# Patient Record
Sex: FEMALE | Race: WHITE | NOT HISPANIC OR LATINO | URBAN - METROPOLITAN AREA
[De-identification: names, ages, dates, MRNs, and addresses within clinical notes are randomized per-mention and may not be internally consistent; named-entity substitution may affect disease eponyms.]

---

## 2019-06-22 ENCOUNTER — EMERGENCY (EMERGENCY)
Facility: HOSPITAL | Age: 44
LOS: 1 days | Discharge: HOME | End: 2019-06-22
Attending: EMERGENCY MEDICINE
Payer: COMMERCIAL

## 2019-06-22 VITALS
HEIGHT: 64 IN | HEART RATE: 104 BPM | OXYGEN SATURATION: 97 % | TEMPERATURE: 99 F | SYSTOLIC BLOOD PRESSURE: 121 MMHG | DIASTOLIC BLOOD PRESSURE: 82 MMHG | RESPIRATION RATE: 16 BRPM

## 2019-06-22 PROCEDURE — 73130 X-RAY EXAM OF HAND: CPT | Mod: 26,RT

## 2019-06-22 PROCEDURE — 73110 X-RAY EXAM OF WRIST: CPT | Mod: 26,RT

## 2019-06-22 PROCEDURE — 99283 EMERGENCY DEPT VISIT LOW MDM: CPT | Mod: 25

## 2019-06-22 PROCEDURE — 29125 APPL SHORT ARM SPLINT STATIC: CPT

## 2019-06-22 PROCEDURE — 29125 APPL SHORT ARM SPLINT STATIC: CPT | Mod: RT

## 2019-06-22 PROCEDURE — 99284 EMERGENCY DEPT VISIT MOD MDM: CPT | Mod: 25

## 2019-06-22 PROCEDURE — 73130 X-RAY EXAM OF HAND: CPT

## 2019-06-22 PROCEDURE — 73110 X-RAY EXAM OF WRIST: CPT

## 2019-06-22 RX ORDER — IBUPROFEN 200 MG
600 TABLET ORAL ONCE
Refills: 0 | Status: COMPLETED | OUTPATIENT
Start: 2019-06-22 | End: 2019-06-22

## 2019-06-22 RX ORDER — IBUPROFEN 200 MG
1 TABLET ORAL
Qty: 28 | Refills: 0
Start: 2019-06-22 | End: 2019-06-28

## 2019-06-22 RX ADMIN — Medication 600 MILLIGRAM(S): at 10:02

## 2019-06-22 NOTE — ED ADULT NURSE NOTE - NSIMPLEMENTINTERV_GEN_ALL_ED
Implemented All Universal Safety Interventions:  [place] to call system. Call bell, personal items and telephone within reach. Instruct patient to call for assistance. Room bathroom lighting operational. Non-slip footwear when patient is off stretcher. Physically safe environment: no spills, clutter or unnecessary equipment. Stretcher in lowest position, wheels locked, appropriate side rails in place.

## 2019-06-22 NOTE — ED STATDOCS - ATTENDING CONTRIBUTION TO CARE
I, John Andino, performed the initial face to face bedside interview with this patient regarding history of present illness, review of symptoms and relevant past medical, social and family history.  I completed an independent physical examination.  I was the initial provider who evaluated this patient. I have signed out the follow up of any pending tests (i.e. labs, radiological studies) to the ACP.  I have communicated the patient’s plan of care and disposition with the ACP.  The history, relevant review of systems, past medical and surgical history, medical decision making, and physical examination was documented by the scribe in my presence and I attest to the accuracy of the documentation.

## 2019-06-22 NOTE — ED STATDOCS - PROGRESS NOTE DETAILS
PRIMO Whaley: Patient evaluated by intake physician. HPI/ROS/PE as noted above. Will follow up plan per intake physician and continue to assess patient. States was climbing over fence, lost footing, landing on right side. Will splint given severity of pain and swelling. Refer to hand/ortho.  PCP: None

## 2019-06-22 NOTE — ED STATDOCS - OBJECTIVE STATEMENT
43 y/o F pt with no PMHx presents to the ED c/o right hand pain. Patient reports isolated right hand s/p trip and fall. Denies head injury, LOC, CP, SOB.

## 2019-06-22 NOTE — ED STATDOCS - NEURO NEGATIVE STATEMENT, MLM
no loss of consciousness, no gait abnormality, no headache, no sensory deficits, and no weakness. No head injury.

## 2019-06-22 NOTE — ED ADULT TRIAGE NOTE - CHIEF COMPLAINT QUOTE
pt states she tripped and fell over a wire fence this morning.  landed on right hip.  c/o right hand pain.

## 2020-10-20 NOTE — ED STATDOCS - CPE ED EYE NORM
Patient can be notified results are stable  and no adjustments or corrections need to be made at this time.
bilateral normal...

## 2022-06-16 ENCOUNTER — EMERGENCY (EMERGENCY)
Facility: HOSPITAL | Age: 47
LOS: 1 days | Discharge: HOME | End: 2022-06-16
Attending: STUDENT IN AN ORGANIZED HEALTH CARE EDUCATION/TRAINING PROGRAM
Payer: COMMERCIAL

## 2022-06-16 VITALS
RESPIRATION RATE: 18 BRPM | DIASTOLIC BLOOD PRESSURE: 81 MMHG | HEART RATE: 92 BPM | SYSTOLIC BLOOD PRESSURE: 118 MMHG | TEMPERATURE: 98 F | OXYGEN SATURATION: 96 %

## 2022-06-16 VITALS
TEMPERATURE: 98 F | WEIGHT: 235.67 LBS | SYSTOLIC BLOOD PRESSURE: 162 MMHG | OXYGEN SATURATION: 98 % | HEART RATE: 120 BPM | HEIGHT: 64 IN | DIASTOLIC BLOOD PRESSURE: 84 MMHG | RESPIRATION RATE: 18 BRPM

## 2022-06-16 DIAGNOSIS — Z98.891 HISTORY OF UTERINE SCAR FROM PREVIOUS SURGERY: Chronic | ICD-10-CM

## 2022-06-16 PROBLEM — Z78.9 OTHER SPECIFIED HEALTH STATUS: Chronic | Status: ACTIVE | Noted: 2019-06-22

## 2022-06-16 LAB
ALBUMIN SERPL ELPH-MCNC: 4.5 G/DL — SIGNIFICANT CHANGE UP (ref 3.3–5.2)
ALP SERPL-CCNC: 51 U/L — SIGNIFICANT CHANGE UP (ref 40–120)
ALT FLD-CCNC: 24 U/L — SIGNIFICANT CHANGE UP
ANION GAP SERPL CALC-SCNC: 15 MMOL/L — SIGNIFICANT CHANGE UP (ref 5–17)
APTT BLD: 26.6 SEC — LOW (ref 27.5–35.5)
AST SERPL-CCNC: 25 U/L — SIGNIFICANT CHANGE UP
BASOPHILS # BLD AUTO: 0.08 K/UL — SIGNIFICANT CHANGE UP (ref 0–0.2)
BASOPHILS NFR BLD AUTO: 1.1 % — SIGNIFICANT CHANGE UP (ref 0–2)
BILIRUB SERPL-MCNC: 0.8 MG/DL — SIGNIFICANT CHANGE UP (ref 0.4–2)
BLD GP AB SCN SERPL QL: SIGNIFICANT CHANGE UP
BUN SERPL-MCNC: 8.4 MG/DL — SIGNIFICANT CHANGE UP (ref 8–20)
CALCIUM SERPL-MCNC: 9 MG/DL — SIGNIFICANT CHANGE UP (ref 8.6–10.2)
CHLORIDE SERPL-SCNC: 104 MMOL/L — SIGNIFICANT CHANGE UP (ref 98–107)
CO2 SERPL-SCNC: 21 MMOL/L — LOW (ref 22–29)
CREAT SERPL-MCNC: 0.77 MG/DL — SIGNIFICANT CHANGE UP (ref 0.5–1.3)
EGFR: 95 ML/MIN/1.73M2 — SIGNIFICANT CHANGE UP
EOSINOPHIL # BLD AUTO: 0.38 K/UL — SIGNIFICANT CHANGE UP (ref 0–0.5)
EOSINOPHIL NFR BLD AUTO: 5.4 % — SIGNIFICANT CHANGE UP (ref 0–6)
GLUCOSE SERPL-MCNC: 95 MG/DL — SIGNIFICANT CHANGE UP (ref 70–99)
HCG SERPL-ACNC: <4 MIU/ML — SIGNIFICANT CHANGE UP
HCT VFR BLD CALC: 36.5 % — SIGNIFICANT CHANGE UP (ref 34.5–45)
HGB BLD-MCNC: 12.1 G/DL — SIGNIFICANT CHANGE UP (ref 11.5–15.5)
IMM GRANULOCYTES NFR BLD AUTO: 1.4 % — SIGNIFICANT CHANGE UP (ref 0–1.5)
INR BLD: 1 RATIO — SIGNIFICANT CHANGE UP (ref 0.88–1.16)
LYMPHOCYTES # BLD AUTO: 2.23 K/UL — SIGNIFICANT CHANGE UP (ref 1–3.3)
LYMPHOCYTES # BLD AUTO: 31.7 % — SIGNIFICANT CHANGE UP (ref 13–44)
MCHC RBC-ENTMCNC: 31.3 PG — SIGNIFICANT CHANGE UP (ref 27–34)
MCHC RBC-ENTMCNC: 33.2 GM/DL — SIGNIFICANT CHANGE UP (ref 32–36)
MCV RBC AUTO: 94.3 FL — SIGNIFICANT CHANGE UP (ref 80–100)
MONOCYTES # BLD AUTO: 0.57 K/UL — SIGNIFICANT CHANGE UP (ref 0–0.9)
MONOCYTES NFR BLD AUTO: 8.1 % — SIGNIFICANT CHANGE UP (ref 2–14)
NEUTROPHILS # BLD AUTO: 3.68 K/UL — SIGNIFICANT CHANGE UP (ref 1.8–7.4)
NEUTROPHILS NFR BLD AUTO: 52.3 % — SIGNIFICANT CHANGE UP (ref 43–77)
PLATELET # BLD AUTO: 303 K/UL — SIGNIFICANT CHANGE UP (ref 150–400)
POTASSIUM SERPL-MCNC: 3.9 MMOL/L — SIGNIFICANT CHANGE UP (ref 3.5–5.3)
POTASSIUM SERPL-SCNC: 3.9 MMOL/L — SIGNIFICANT CHANGE UP (ref 3.5–5.3)
PROT SERPL-MCNC: 6.6 G/DL — SIGNIFICANT CHANGE UP (ref 6.6–8.7)
PROTHROM AB SERPL-ACNC: 11.6 SEC — SIGNIFICANT CHANGE UP (ref 10.5–13.4)
RBC # BLD: 3.87 M/UL — SIGNIFICANT CHANGE UP (ref 3.8–5.2)
RBC # FLD: 13.1 % — SIGNIFICANT CHANGE UP (ref 10.3–14.5)
SODIUM SERPL-SCNC: 140 MMOL/L — SIGNIFICANT CHANGE UP (ref 135–145)
WBC # BLD: 7.04 K/UL — SIGNIFICANT CHANGE UP (ref 3.8–10.5)
WBC # FLD AUTO: 7.04 K/UL — SIGNIFICANT CHANGE UP (ref 3.8–10.5)

## 2022-06-16 PROCEDURE — 86900 BLOOD TYPING SEROLOGIC ABO: CPT

## 2022-06-16 PROCEDURE — 76856 US EXAM PELVIC COMPLETE: CPT

## 2022-06-16 PROCEDURE — 99284 EMERGENCY DEPT VISIT MOD MDM: CPT | Mod: 25

## 2022-06-16 PROCEDURE — 86850 RBC ANTIBODY SCREEN: CPT

## 2022-06-16 PROCEDURE — 76830 TRANSVAGINAL US NON-OB: CPT | Mod: 26

## 2022-06-16 PROCEDURE — 36415 COLL VENOUS BLD VENIPUNCTURE: CPT

## 2022-06-16 PROCEDURE — 76830 TRANSVAGINAL US NON-OB: CPT

## 2022-06-16 PROCEDURE — 86901 BLOOD TYPING SEROLOGIC RH(D): CPT

## 2022-06-16 PROCEDURE — 99243 OFF/OP CNSLTJ NEW/EST LOW 30: CPT

## 2022-06-16 PROCEDURE — 85025 COMPLETE CBC W/AUTO DIFF WBC: CPT

## 2022-06-16 PROCEDURE — 76856 US EXAM PELVIC COMPLETE: CPT | Mod: 26

## 2022-06-16 PROCEDURE — 84702 CHORIONIC GONADOTROPIN TEST: CPT

## 2022-06-16 PROCEDURE — 85610 PROTHROMBIN TIME: CPT

## 2022-06-16 PROCEDURE — 99285 EMERGENCY DEPT VISIT HI MDM: CPT

## 2022-06-16 PROCEDURE — 80053 COMPREHEN METABOLIC PANEL: CPT

## 2022-06-16 PROCEDURE — 85730 THROMBOPLASTIN TIME PARTIAL: CPT

## 2022-06-16 RX ORDER — NORETHINDRONE 0.35 MG/1
1 TABLET ORAL
Qty: 120 | Refills: 0
Start: 2022-06-16 | End: 2022-07-15

## 2022-06-16 RX ORDER — SODIUM CHLORIDE 9 MG/ML
1000 INJECTION, SOLUTION INTRAVENOUS ONCE
Refills: 0 | Status: COMPLETED | OUTPATIENT
Start: 2022-06-16 | End: 2022-06-16

## 2022-06-16 RX ADMIN — SODIUM CHLORIDE 1000 MILLILITER(S): 9 INJECTION, SOLUTION INTRAVENOUS at 18:57

## 2022-06-16 NOTE — ED ADULT NURSE REASSESSMENT NOTE - NS ED NURSE REASSESS COMMENT FT1
Report received, care assumed.  47 year old female presents to ED with complaint of vaginal bleeding.  A&Ox4  Awaiting GYN examination.  Resting comfortably on stretcher at this time in no apparent distress.  Offers no complaints at this time.

## 2022-06-16 NOTE — ED PROVIDER NOTE - CARE PROVIDER_API CALL
Marty Mcknight)  Obstetrics and Gynecology  [address], [address]  [place] [address]  Phone: [phone]  Fax: [phone]  Follow Up Time:

## 2022-06-16 NOTE — CONSULT NOTE ADULT - ASSESSMENT
47y  w persistent vaginal bleeding x2m, VSS, Hgb 12.1.    - no active bleeding on exam, VSS, Hgb 12.1, pt states she is feeling better  - US read appreciated  - norethindrone 5mg q6h sent to patient's pharmacy. She is to continue to take the medications until she follows up with a gynecologist for definitive management.   - recommend follow up tomorrow with Dr. Mcknight,  [facility] [place] [address], [phone]  - no indication for inpatient gyn management  - clear for dc from gyn perspective    discussed with Dr. Austin 47y  w persistent vaginal bleeding x2m, VSS, Hgb 12.1.    - no active bleeding on exam, VSS, Hgb 12.1, pt states she is feeling better  - US read appreciated  - norethindrone 5mg q6h sent to patient's pharmacy. She is to continue to take the medications until she follows up with a gynecologist for definitive management.   - recommend follow up tomorrow with Dr. Mcknight,  [facility] [place] [address], [phone]  - no indication for inpatient gyn management  - clear for dc from gyn perspective    discussed with Dr. Austin    Addendum:    Subjective Hx, Physical Exam, Laboratory & Imaging results reviewed.  I agree with the Resident Physician's assessment and plan of care, as discussed above.  Call, follow up, and return to Hospital parameters reviewed at length.  She was given the opportunity to ask questions and all were addressed.  Discharge home    Jason Austin, DO

## 2022-06-16 NOTE — ED PROVIDER NOTE - PHYSICAL EXAMINATION
General: well-appearing woman in no acute distress  Head: normocephalic, atraumatic  Eyes: clear eyes, pink conjunctiva  Mouth: moist mucous membranes  Neck: supple neck  CV: tachycardic, no LE edema, peripheral pulses 2+ bilateral UE and LE  Respiratory: clear to auscultation bilaterally  Abdomen: soft, nondistended, nontender  : no suprapubic tenderness, no CVAT  MSK: no joint deformities  Neuro: alert and oriented x3, speech clear, moving all extremities spontaneously without difficulty  Skin: no rash noted General: well-appearing woman in no acute distress  Head: normocephalic, atraumatic  Eyes: clear eyes, pink conjunctiva  Mouth: moist mucous membranes  Neck: supple neck  CV: tachycardic, no LE edema, peripheral pulses 2+ bilateral UE and LE  Respiratory: clear to auscultation bilaterally  Abdomen: soft, nondistended, nontender  : no suprapubic tenderness, no CVAT; chaperoned by Gisele, PCA, dark blood in vaginal canal with pooling but no hemorrhaging, no masses felt  MSK: no joint deformities  Neuro: alert and oriented x3, speech clear, moving all extremities spontaneously without difficulty  Skin: no rash noted

## 2022-06-16 NOTE — ED PROVIDER NOTE - NS ED ROS FT
General: no fever  Head: no headache, +lightheadedness  Eyes: no vision change  ENT: no nasal discharge/congestion  CV: no chest pain  Resp: no SOB, no cough  GI: no N/V/D, no abdominal pain  : no dysuria, +vaginal bleeding  MSK: no joint pain  Skin: no new rash  Neuro: no focal weakness, no change in sensation

## 2022-06-16 NOTE — CONSULT NOTE ADULT - SUBJECTIVE AND OBJECTIVE BOX
47y  presents c/o continuous bleeding for the past 2 months. Pt states that the bleeding increased in severity these past few weeks and she has been having to change her pads every 2-3 hours. She states today she was feeling a bit lightheaded and tachycardic, so she came to the ED for evaluation. She has not followed up with a gyn or PMD in many years. She states that she is no longer feeling lightheaded or dizzy currently. Pt states that she had regular monthly menses up until December and then did not have her menses again until April, and she has had persistent bleeding since. She denies any n/v, CP/SOB. No other complaints.     POb: CSx1   PGyn: pt denies any h/o abnl pap, STDs, fibroids, cysts  PMH: pt denies any medical conditions, including coagulopathies  PSH: CSx1   Meds: pt states she is not taking any medications regularly  All: sulfa, shellfish  SH: pt denies smoking, illicit drug, alcohol use; feels safe at home    Vital Signs Last 24 Hrs  T(C): 36.6 (2022 19:18), Max: 36.9 (2022 13:04)  T(F): 97.8 (2022 19:18), Max: 98.4 (2022 13:04)  HR: 92 (2022 19:18) (92 - 120)  BP: 118/81 (2022 19:18) (118/81 - 162/84)  RR: 18 (2022 19:18) (18 - 18)  SpO2: 96% (2022 19:18) (96% - 98%)    Gen: well-appearing, NAD   Resp: breathing comfortably on RA   Abd: nontender, soft, no rebound/guarding  VE: passage of clots w valsalva visualized on spec exam, no active bleeding                           12.1   7.04  )-----------( 303      ( 2022 16:57 )             36.5     06-16    140  |  104  |  8.4  ----------------------------<  95  3.9   |  21.0<L>  |  0.77    Ca    9.0      2022 16:57    TPro  6.6  /  Alb  4.5  /  TBili  0.8  /  DBili  x   /  AST  25  /  ALT  24  /  AlkPhos  51  -16    Type + Screen (22 @ 17:20)    ABO RH Interpretation: B POS: 2022 18:17  NCLUPO  Confirm type required if blood product is needed.    < from: US Transvaginal (22 @ 18:16) >  FINDINGS:  Uterus: 13.5 cm x 6.2 cm x 6.9 cm. Within normal limits.  Endometrium: 25 mm. Thickened with tiny cystic spaces. Hypervascular area   within theupper cervix of indeterminate significance.    Right ovary: 3.7 cm x 1.9 cm x 2.2 cm. Within normal limits.  Left ovary: Not visualized.    Fluid: None.    IMPRESSION:  Abnormally thickened endometrial stripe with tiny cystic spaces. Further   gynecologic evaluation is recommended. Correlation with beta hCG   recommended to confirm patient is not pregnant.    Hypervascular area within the upper cervix of indeterminate significance.   Underlying lesion in this region is not excluded.    Left ovarynot visualized.    --- End of Report ---    < end of copied text >

## 2022-06-16 NOTE — ED PROVIDER NOTE - CLINICAL SUMMARY MEDICAL DECISION MAKING FREE TEXT BOX
46yo woman presents with persistent vaginal bleeding x 2 months with associated lightheadedness today. post perimenopausal symptoms. evaluated with blood work to check h/h, hcg, urine, and ultrasound.

## 2022-06-16 NOTE — ED PROVIDER NOTE - OBJECTIVE STATEMENT
48yo woman PMH seasonal allergies presents with persistent vaginal bleeding x 2 months and associated lightheadedness today. No associated chest pain or SOB. Pt states that she likely had covid 6 months ago and started to have irregularly menses and started current menses about 2 months ago. Pt denies abdominal pain or dysuria. No associated n/v/d.

## 2022-06-16 NOTE — ED PROVIDER NOTE - NSFOLLOWUPINSTRUCTIONS_ED_ALL_ED_FT
You were seen and evaluated in the emergency room for vaginal bleeding    Please follow up with an OB tomorrow. Call the number attached to make an appointment.   Dr. Mcknight  [address] [place] [address] [phone]    Take the medication as prescribed every 6 hours.     Take 650mg tylenol every 6 hours as needed for pain.  Take 400mg ibuprofen every 6 hours as needed for pain, make sure to take with food.    Continue all other home medications as prescribed.    Seek care immediately if:   You have severe pain in your abdomen.   Your bowel movement has blood in it, or looks like black tar.   You cannot stop vomiting, or vomit blood.  Your abdomen is larger than usual, very painful, and hard.   You stop passing gas or having bowel movements.   You have heavy vaginal bleeding.  You have chest pain or shortness of breath.  You feel weak, dizzy, or faint.  Or any worse or abnormal symptoms.     Please read all attached.

## 2022-06-16 NOTE — ED ADULT TRIAGE NOTE - CHIEF COMPLAINT QUOTE
pt states having vaginal bleeding x 8 weeks, passing clots  A&Ox3, resp wnl, sent by urgent care for Sono

## 2022-06-16 NOTE — ED ADULT TRIAGE NOTE - TEMPERATURE IN FAHRENHEIT (DEGREES F)
Problem: Adult Inpatient Plan of Care  Goal: Plan of Care Review  Outcome: Ongoing, Progressing  Flowsheets (Taken 12/31/2021 0626)  Plan of Care Reviewed With: patient  Goal: Patient-Specific Goal (Individualized)  Outcome: Ongoing, Progressing  Flowsheets (Taken 12/31/2021 0626)  Anxieties, Fears or Concerns: oxygen level  Individualized Care Needs: monitor SpO2. AC/HS, PRN meds for anxiety and insomnia     Problem: Fluid and Electrolyte Imbalance (Acute Kidney Injury/Impairment)  Goal: Fluid and Electrolyte Balance  Outcome: Ongoing, Progressing     Problem: Fall Injury Risk  Goal: Absence of Fall and Fall-Related Injury  Outcome: Ongoing, Progressing      98.4

## 2022-06-16 NOTE — ED ADULT NURSE NOTE - OBJECTIVE STATEMENT
patient with vaginal bleeding x 8 weeks with bleeding through overnight pads the last few weeks in 2 hours. patient is tachycardic on cardiac monitor, feels lightheaded. patient does not have outpatient obgyn.

## 2022-06-16 NOTE — ED PROVIDER NOTE - PROGRESS NOTE DETAILS
MD Zachariah: h/h wnl, no need for transfusion at this time. awaiting ultrasound results. MD Zachariah: pt was evaluated by gyn and cleared for discharge and outpt f/u. will discharge patient home at this time. printed out copies of results for patient to take home. discussed return precautions and need for outpatient follow up.

## 2022-06-16 NOTE — ED PROVIDER NOTE - PATIENT PORTAL LINK FT
You can access the FollowMyHealth Patient Portal offered by [facility] by registering at the following website: [email or web address]. By joining [facility]’s FollowMyHealth portal, you will also be able to view your health information using other applications (apps) compatible with our system.

## 2022-06-20 ENCOUNTER — NON-APPOINTMENT (OUTPATIENT)
Age: 47
End: 2022-06-20

## 2022-06-20 ENCOUNTER — APPOINTMENT (OUTPATIENT)
Dept: OBGYN | Facility: CLINIC | Age: 47
End: 2022-06-20
Payer: COMMERCIAL

## 2022-06-20 VITALS
BODY MASS INDEX: 40.29 KG/M2 | DIASTOLIC BLOOD PRESSURE: 80 MMHG | HEIGHT: 64 IN | WEIGHT: 236 LBS | SYSTOLIC BLOOD PRESSURE: 140 MMHG

## 2022-06-20 DIAGNOSIS — Z01.411 ENCOUNTER FOR GYNECOLOGICAL EXAMINATION (GENERAL) (ROUTINE) WITH ABNORMAL FINDINGS: ICD-10-CM

## 2022-06-20 DIAGNOSIS — Z78.9 OTHER SPECIFIED HEALTH STATUS: ICD-10-CM

## 2022-06-20 DIAGNOSIS — Z83.438 FAMILY HISTORY OF OTHER DISORDER OF LIPOPROTEIN METABOLISM AND OTHER LIPIDEMIA: ICD-10-CM

## 2022-06-20 PROCEDURE — 99396 PREV VISIT EST AGE 40-64: CPT

## 2022-06-20 NOTE — PHYSICAL EXAM
[Chaperone Present] : A chaperone was present in the examining room during all aspects of the physical examination [Appropriately responsive] : appropriately responsive [Alert] : alert [No Acute Distress] : no acute distress [Soft] : soft [Non-tender] : non-tender [Non-distended] : non-distended [No HSM] : No HSM [No Lesions] : no lesions [No Mass] : no mass [Oriented x3] : oriented x3 [Examination Of The Breasts] : a normal appearance [No Masses] : no breast masses were palpable [Labia Majora] : normal [Labia Minora] : normal [Uterine Adnexae] : normal [Normal] : normal [Old Blood] : old blood was present in the vagina [Enlarged ___ wks] : enlarged [unfilled] ~Uweeks

## 2022-06-20 NOTE — HISTORY OF PRESENT ILLNESS
[Y] : Patient reports abnormal menses [N] : Patient is not sexually active [Menarche Age: ____] : age at menarche was [unfilled] [FreeTextEntry1] : 47 year old female presents for abnormal uterine bleeding. \par \par Pt. states she started bleeding at the end of April which she thought was a normal menstrual cycle but has since not stopped. She began feeling light headed and dizzy with the passage of clots on 6/16/22 where she was then evaluated at [facility]. She had sonogram and blood work performed at [facility]. She was given progesterone 5mg to be taken every 6 hours to stop the bleeding. Janie states her periods were becoming more erratic in nature since Dec. 2021 with her cycle length shortening/ lengthening from about 21- 40 days. \par \par Janie has not had annual screening (pap/mammogram) in several years. No complaints of pelvic pan and vaginal discharge.  [PGxTotal] : 1 [[facility]] : 1 [PGHxPremature] : 0 [PGHxAbortions] : 0 [[facility]] : 1 [PGHxABInduced] : 0 [PGHxABSpont] : 0 [PGHxEctopic] : 0 [PGHxMultBirths] : 0

## 2022-06-20 NOTE — PLAN
[FreeTextEntry1] : Encounter for GYN exam \par \par - PAP obtained \par - Mammogram prescription given \par \par Abnormal uterine bleeding \par \par - US reviewed\par - Plan of care and sonographic results discussed with Dr. Wilkinson. Pt. to have endometrial sampling Wednesday 6/22/22. \par \par F/U in 2 days \par All questions and concerns addressed during encounter. Pt. agreed to plan of care.

## 2022-06-21 LAB — HPV HIGH+LOW RISK DNA PNL CVX: NOT DETECTED

## 2022-06-22 ENCOUNTER — APPOINTMENT (OUTPATIENT)
Dept: OBGYN | Facility: CLINIC | Age: 47
End: 2022-06-22
Payer: COMMERCIAL

## 2022-06-22 VITALS
DIASTOLIC BLOOD PRESSURE: 90 MMHG | SYSTOLIC BLOOD PRESSURE: 130 MMHG | WEIGHT: 236.31 LBS | BODY MASS INDEX: 40.34 KG/M2 | HEIGHT: 64 IN

## 2022-06-22 LAB
HCG UR QL: NEGATIVE
QUALITY CONTROL: YES

## 2022-06-22 PROCEDURE — 58558Z: CUSTOM

## 2022-06-22 PROCEDURE — 81025 URINE PREGNANCY TEST: CPT

## 2022-06-22 NOTE — PROCEDURE

## 2022-06-23 LAB — CYTOLOGY CVX/VAG DOC THIN PREP: NORMAL

## 2022-07-07 LAB — CORE LAB BIOPSY: NORMAL

## 2022-07-13 ENCOUNTER — APPOINTMENT (OUTPATIENT)
Dept: OBGYN | Facility: CLINIC | Age: 47
End: 2022-07-13

## 2022-07-13 VITALS
HEIGHT: 64 IN | WEIGHT: 236 LBS | BODY MASS INDEX: 40.29 KG/M2 | SYSTOLIC BLOOD PRESSURE: 150 MMHG | DIASTOLIC BLOOD PRESSURE: 80 MMHG

## 2022-07-13 DIAGNOSIS — Z30.09 ENCOUNTER FOR OTHER GENERAL COUNSELING AND ADVICE ON CONTRACEPTION: ICD-10-CM

## 2022-07-13 PROCEDURE — 99213 OFFICE O/P EST LOW 20 MIN: CPT

## 2022-07-13 RX ORDER — NORETHINDRONE ACETATE AND ETHINYL ESTRADIOL AND FERROUS FUMARATE 1MG-20(21)
1-20 KIT ORAL
Qty: 3 | Refills: 1 | Status: ACTIVE | COMMUNITY
Start: 2022-07-13 | End: 1900-01-01

## 2022-07-13 RX ORDER — MEDROXYPROGESTERONE ACETATE 10 MG/1
10 TABLET ORAL
Qty: 10 | Refills: 0 | Status: ACTIVE | COMMUNITY
Start: 2022-07-13 | End: 1900-01-01

## 2022-07-13 NOTE — REASON FOR VISIT
[Follow-Up] : a follow-up evaluation of [FreeTextEntry2] : abnormal uterine bleeding.  The patient presents for results of a hysteroscopy, endometrial biopsy.  She continues to complain of abnormal bleeding.

## 2022-07-19 ENCOUNTER — APPOINTMENT (OUTPATIENT)
Dept: OBGYN | Facility: CLINIC | Age: 47
End: 2022-07-19

## 2022-07-19 VITALS
BODY MASS INDEX: 40.29 KG/M2 | HEIGHT: 64 IN | WEIGHT: 236 LBS | SYSTOLIC BLOOD PRESSURE: 140 MMHG | DIASTOLIC BLOOD PRESSURE: 90 MMHG

## 2022-07-19 PROCEDURE — 99214 OFFICE O/P EST MOD 30 MIN: CPT

## 2022-07-19 NOTE — PHYSICAL EXAM
[Chaperone Present] : A chaperone was present in the examining room during all aspects of the physical examination [Labia Majora] : normal [Labia Minora] : normal [Heavy] : There was heavy vaginal bleeding [Normal] : normal [Uterine Adnexae] : normal

## 2022-07-19 NOTE — HISTORY OF PRESENT ILLNESS
[FreeTextEntry1] : 47  year old female presents for abnormal uterine bleeding. Pt. states that she was taking progesterone and was then going to start OCP when she states she expelled something " huge from her vagina". Since then Janie states she has had severe cramps and severe vaginal bleeding. Cramps are unrelieved by motrin and tylenol.

## 2022-07-19 NOTE — PLAN
[FreeTextEntry1] : Abnormal uterine bleeding \par \par - US ordered \par - Advised patient to start OCP to see if bleeding stops \par - Follow up after sonogram to devise plan of care medical vs surgical intervention. Pt. advised that if she experiences any chest pain, difficulty breathing, heart palpitations, lightheadedness to return to hospital. \par \par All questions and concerns addressed during encounter. Pt. agreed to plan of care.

## 2022-07-29 ENCOUNTER — APPOINTMENT (OUTPATIENT)
Dept: ANTEPARTUM | Facility: CLINIC | Age: 47
End: 2022-07-29

## 2022-07-29 ENCOUNTER — ASOB RESULT (OUTPATIENT)
Age: 47
End: 2022-07-29

## 2022-07-29 PROCEDURE — 76830 TRANSVAGINAL US NON-OB: CPT

## 2022-07-29 PROCEDURE — 76856 US EXAM PELVIC COMPLETE: CPT | Mod: 59

## 2022-08-03 ENCOUNTER — NON-APPOINTMENT (OUTPATIENT)
Age: 47
End: 2022-08-03

## 2022-08-12 ENCOUNTER — APPOINTMENT (OUTPATIENT)
Dept: OBGYN | Facility: CLINIC | Age: 47
End: 2022-08-12

## 2022-08-12 VITALS
SYSTOLIC BLOOD PRESSURE: 132 MMHG | HEIGHT: 64 IN | BODY MASS INDEX: 39.81 KG/M2 | WEIGHT: 233.19 LBS | DIASTOLIC BLOOD PRESSURE: 86 MMHG

## 2022-08-12 PROCEDURE — 99213 OFFICE O/P EST LOW 20 MIN: CPT

## 2022-08-12 NOTE — HISTORY OF PRESENT ILLNESS
[FreeTextEntry1] : 47 year old female presents for results of her transvaginal ultrasound and continued abnormal uterine bleeding. The patient is taking her OCP with no relief and continued bleeding. The patient requests further treatment.

## 2022-08-12 NOTE — PLAN
[FreeTextEntry1] : Abnormal uterine bleeding \par \par - The patient desires intervention. Sonogram reviewed and tissue biopsy reviewed. Treatment options were discussed with the patient. The patient requests an endometrial ablation. Risks and benefits regarding the procedure were discussed, patient verbalized understanding. The patient was booked for a SHRUTHI procedure with Dr. Wilkinson. \par \par All questions and concerns addressed during encounter. Pt. agreed to plan of care.

## 2022-08-30 RX ORDER — IBUPROFEN 800 MG/1
800 TABLET, FILM COATED ORAL
Qty: 21 | Refills: 0 | Status: ACTIVE | COMMUNITY
Start: 2022-08-30 | End: 1900-01-01

## 2022-08-30 RX ORDER — MISOPROSTOL 200 UG/1
200 TABLET ORAL
Qty: 2 | Refills: 0 | Status: ACTIVE | COMMUNITY
Start: 2022-08-30 | End: 1900-01-01

## 2022-09-01 ENCOUNTER — APPOINTMENT (OUTPATIENT)
Dept: OBGYN | Facility: CLINIC | Age: 47
End: 2022-09-01

## 2022-09-01 ENCOUNTER — NON-APPOINTMENT (OUTPATIENT)
Age: 47
End: 2022-09-01

## 2022-09-01 VITALS
WEIGHT: 235 LBS | SYSTOLIC BLOOD PRESSURE: 134 MMHG | DIASTOLIC BLOOD PRESSURE: 82 MMHG | HEIGHT: 64 IN | BODY MASS INDEX: 40.12 KG/M2

## 2022-09-01 DIAGNOSIS — N93.9 ABNORMAL UTERINE AND VAGINAL BLEEDING, UNSPECIFIED: ICD-10-CM

## 2022-09-01 LAB
HCG UR QL: NEGATIVE
QUALITY CONTROL: YES

## 2022-09-01 PROCEDURE — 81025 URINE PREGNANCY TEST: CPT

## 2022-09-01 PROCEDURE — 58563Z: CUSTOM

## 2022-09-01 RX ORDER — KETOROLAC TROMETHAMINE 60 MG/2ML
60 INJECTION, SOLUTION INTRAMUSCULAR
Qty: 1 | Refills: 0 | Status: COMPLETED | OUTPATIENT
Start: 2022-09-01

## 2022-09-01 RX ADMIN — KETOROLAC TROMETHAMINE 0 MG/2ML: 60 INJECTION, SOLUTION INTRAMUSCULAR at 00:00

## 2022-09-01 NOTE — PROCEDURE
[Hysteroscopy] : Hysteroscopy [Time out performed] : Pre-procedure time out performed.  Patient's name, date of birth and procedure confirmed. [Consent Obtained] : Consent obtained [Abnormal uterine bleeding] : abnormal uterine bleeding [Risks] : risks [Benefits] : benefits [Alternatives] : alternatives [Patient] : patient [Infection] : infection [Bleeding] : bleeding [Allergic Reaction] : allergic reaction [Cytotec] : Cytotec [Lidocaine___ mL] : [unfilled] ~UmL of lidocaine [flexible] : Using aseptic technique a hysteroscopy was performed using a flexible hysteroscope [Hemostasis obtained] : hemostasis obtained [Tolerated Well] : Patient tolerated the procedure well [Aftercare instructions/regstrictions given and follow-up scheduled] : Aftercare instructions/restrictions given and follow-up scheduled [de-identified] : a normal endometrial cavity.  A quentin endometrial ablation was performed in the appropriate manner.  Then a hysteroscopy was again performed revealing an adequate endometrial ablation.

## 2022-09-01 NOTE — PROCEDURE
[Hysteroscopy] : Hysteroscopy [Time out performed] : Pre-procedure time out performed.  Patient's name, date of birth and procedure confirmed. [Consent Obtained] : Consent obtained [Abnormal uterine bleeding] : abnormal uterine bleeding [Risks] : risks [Benefits] : benefits [Alternatives] : alternatives [Patient] : patient [Infection] : infection [Bleeding] : bleeding [Allergic Reaction] : allergic reaction [Cytotec] : Cytotec [Lidocaine___ mL] : [unfilled] ~UmL of lidocaine [flexible] : Using aseptic technique a hysteroscopy was performed using a flexible hysteroscope [Hemostasis obtained] : hemostasis obtained [Tolerated Well] : Patient tolerated the procedure well [Aftercare instructions/regstrictions given and follow-up scheduled] : Aftercare instructions/restrictions given and follow-up scheduled [de-identified] : a normal endometrial cavity.  A quentin endometrial ablation was performed in the appropriate manner.  Then a hysteroscopy was again performed revealing an adequate endometrial ablation.

## 2022-12-01 ENCOUNTER — APPOINTMENT (OUTPATIENT)
Dept: OBGYN | Facility: CLINIC | Age: 47
End: 2022-12-01

## 2024-08-20 NOTE — PROCEDURE
Detail Level: Detailed [Cervical Pap Smear] : cervical Pap smear [Tolerated Well] : the patient tolerated the procedure well [No Complications] : there were no complications